# Patient Record
Sex: FEMALE | Race: WHITE | Employment: FULL TIME | URBAN - METROPOLITAN AREA
[De-identification: names, ages, dates, MRNs, and addresses within clinical notes are randomized per-mention and may not be internally consistent; named-entity substitution may affect disease eponyms.]

---

## 2022-03-19 PROBLEM — M54.32 SCIATICA OF LEFT SIDE: Status: ACTIVE | Noted: 2021-03-23

## 2022-12-20 NOTE — PROGRESS NOTES
Patient presents today for a routine gynecological examination with no complaints. Has MGM scheduled for next week. Needs PCP, will refer. OB History          2    Para   2    Term   2            AB        Living   2         SAB        IAB        Ectopic        Molar        Multiple        Live Births   2                  GYN History   Last Pap Smear: 21  Pap: negative   HPV: negative        Patient's last menstrual period was 2022 (exact date). Cycle Length 28   Lasting 5  negative dysmenorrhea;   negative postcoital bleeding    Past Medical History:  Past Medical History:   Diagnosis Date    Acute cystitis     Dysuria     Endometriosis     Gestational diabetes     with first pregnancy    Hx of seasonal allergies     Interstitial cystitis     Interstitial cystitis     Multiple thyroid nodules     Other chronic cystitis        Past Surgical History:  Past Surgical History:   Procedure Laterality Date    GYN   and 3/11    GYN  2014    laparoscopy    OTHER SURGICAL HISTORY      wisdom teeth removed    OTHER SURGICAL HISTORY      Cysto [place]-distention    UROLOGICAL SURGERY  2014    Cystoscopy and Hydroistension of bladder-Dr. Richi Pickett    WISDOM TOOTH EXTRACTION         Allergies: Allergies   Allergen Reactions    Latex Rash     Not sure if true allergy - Bandaids break her out. Sulfamethoxazole-Trimethoprim Other (See Comments)     Enlarged lymph nodes        Medication History:  Current Outpatient Medications   Medication Sig Dispense Refill    loratadine (CLARITIN) 5 MG/5ML syrup Take by mouth       No current facility-administered medications for this visit.        Social History:  Social History     Socioeconomic History    Marital status:      Spouse name: Not on file    Number of children: Not on file    Years of education: Not on file    Highest education level: Not on file   Occupational History    Not on file   Tobacco Use    Smoking status: Never Smokeless tobacco: Never   Vaping Use    Vaping Use: Never used   Substance and Sexual Activity    Alcohol use: No    Drug use: No    Sexual activity: Yes     Partners: Male     Birth control/protection: Condom   Other Topics Concern    Not on file   Social History Narrative    Not on file     Social Determinants of Health     Financial Resource Strain: Not on file   Food Insecurity: Not on file   Transportation Needs: Not on file   Physical Activity: Not on file   Stress: Not on file   Social Connections: Not on file   Intimate Partner Violence: Not on file   Housing Stability: Not on file       Family History:  Family History   Problem Relation Age of Onset    Cancer Maternal Grandmother         thyroid    Cancer Paternal Grandfather         prostate    Heart Attack Paternal Grandfather     Heart Disease Paternal Grandfather     Osteoarthritis Paternal Grandmother     Hypertension Mother     Other Mother         early hysterectomy    Asthma Father     Cancer Father         prostate removed    Diabetes Maternal Grandfather     Heart Disease Maternal Grandfather        Review of Systems - General ROS: negative except for that discussed in HPI      ROS:  Feeling well. No dyspnea or chest pain on exertion. No abdominal pain, change in bowel habits, black or bloody stools. No urinary tract symptoms. No neurological complaints. Objective:   /60   Wt 127 lb 9.6 oz (57.9 kg)   LMP 12/23/2022 (Exact Date)   BMI 22.60 kg/m²   The patient appears well, alert, oriented x 3, in no distress. ENT normal.  Neck supple. No adenopathy or thyromegaly. Lungs:  clear, good air entry, no wheezes, rhonchi or rales. Heart:  S1 and S2 normal, no murmurs, regular rate and rhythm. Abdomen:  soft without tenderness, guarding, mass or organomegaly. Extremities show no edema, normal peripheral pulses. Neurological is normal, no focal findings.     BREAST EXAM: breasts appear normal, no suspicious masses, no skin or nipple changes or axillary nodes, risk and benefit of breast self-exam was discussed    PELVIC EXAM: VULVA: normal appearing vulva with no masses, tenderness or lesions, VAGINA: normal appearing vagina with normal color and discharge, no lesions, menses noted in vault CERVIX: normal appearing cervix without discharge or lesions, UTERUS: uterus is normal size, shape, consistency and nontender, ADNEXA: normal adnexa in size, nontender and no masses    Assessment/Plan:     1. Screening mammogram for breast cancer    - NAOMY SHAILA DIGITAL SCREEN BILATERAL; Future    2. Screening for genitourinary condition    - AMB POC URINALYSIS DIP STICK AUTO W/O MICRO    3. Well woman exam       Pap due 2024  mammogram  return annually or prn    Supervising physician is Dr. Cruz Hoffman.

## 2022-12-23 ENCOUNTER — OFFICE VISIT (OUTPATIENT)
Dept: OBGYN CLINIC | Age: 42
End: 2022-12-23

## 2022-12-23 VITALS — WEIGHT: 127.6 LBS | BODY MASS INDEX: 22.6 KG/M2 | DIASTOLIC BLOOD PRESSURE: 60 MMHG | SYSTOLIC BLOOD PRESSURE: 112 MMHG

## 2022-12-23 DIAGNOSIS — Z76.89 ENCOUNTER TO ESTABLISH CARE: ICD-10-CM

## 2022-12-23 DIAGNOSIS — Z13.89 SCREENING FOR GENITOURINARY CONDITION: ICD-10-CM

## 2022-12-23 DIAGNOSIS — Z01.419 WELL WOMAN EXAM: Primary | ICD-10-CM

## 2022-12-23 DIAGNOSIS — Z12.31 SCREENING MAMMOGRAM FOR BREAST CANCER: ICD-10-CM

## 2022-12-23 LAB
BILIRUBIN, URINE, POC: NEGATIVE
BLOOD URINE, POC: NORMAL
GLUCOSE URINE, POC: NEGATIVE
KETONES, URINE, POC: NEGATIVE
LEUKOCYTE ESTERASE, URINE, POC: NEGATIVE
NITRITE, URINE, POC: NEGATIVE
PH, URINE, POC: 5.5 (ref 4.6–8)
PROTEIN,URINE, POC: NEGATIVE
SPECIFIC GRAVITY, URINE, POC: 1.02 (ref 1–1.03)
URINALYSIS CLARITY, POC: CLEAR
URINALYSIS COLOR, POC: YELLOW
UROBILINOGEN, POC: NORMAL

## 2023-02-19 PROBLEM — Z76.89 ENCOUNTER TO ESTABLISH CARE WITH NEW DOCTOR: Status: ACTIVE | Noted: 2023-02-19

## 2023-02-19 ASSESSMENT — ENCOUNTER SYMPTOMS
VOMITING: 0
ABDOMINAL PAIN: 0
SHORTNESS OF BREATH: 0
COUGH: 0
DIARRHEA: 0
NAUSEA: 0

## 2023-02-20 ENCOUNTER — OFFICE VISIT (OUTPATIENT)
Dept: PRIMARY CARE CLINIC | Facility: CLINIC | Age: 43
End: 2023-02-20
Payer: COMMERCIAL

## 2023-02-20 VITALS
HEART RATE: 71 BPM | OXYGEN SATURATION: 99 % | WEIGHT: 125.3 LBS | BODY MASS INDEX: 22.2 KG/M2 | HEIGHT: 63 IN | DIASTOLIC BLOOD PRESSURE: 64 MMHG | SYSTOLIC BLOOD PRESSURE: 116 MMHG | RESPIRATION RATE: 16 BRPM

## 2023-02-20 DIAGNOSIS — E04.1 THYROID NODULE: ICD-10-CM

## 2023-02-20 DIAGNOSIS — Z76.89 ENCOUNTER TO ESTABLISH CARE WITH NEW DOCTOR: Primary | ICD-10-CM

## 2023-02-20 DIAGNOSIS — N30.10 INTERSTITIAL CYSTITIS: ICD-10-CM

## 2023-02-20 DIAGNOSIS — K21.9 GASTROESOPHAGEAL REFLUX DISEASE, UNSPECIFIED WHETHER ESOPHAGITIS PRESENT: ICD-10-CM

## 2023-02-20 DIAGNOSIS — M54.32 SCIATICA OF LEFT SIDE: ICD-10-CM

## 2023-02-20 PROCEDURE — 99204 OFFICE O/P NEW MOD 45 MIN: CPT | Performed by: FAMILY MEDICINE

## 2023-02-20 RX ORDER — FAMOTIDINE 20 MG/1
20 TABLET, FILM COATED ORAL 2 TIMES DAILY
Qty: 60 TABLET | Refills: 3 | Status: SHIPPED | OUTPATIENT
Start: 2023-02-20

## 2023-02-20 SDOH — ECONOMIC STABILITY: INCOME INSECURITY: HOW HARD IS IT FOR YOU TO PAY FOR THE VERY BASICS LIKE FOOD, HOUSING, MEDICAL CARE, AND HEATING?: NOT HARD AT ALL

## 2023-02-20 SDOH — ECONOMIC STABILITY: FOOD INSECURITY: WITHIN THE PAST 12 MONTHS, THE FOOD YOU BOUGHT JUST DIDN'T LAST AND YOU DIDN'T HAVE MONEY TO GET MORE.: NEVER TRUE

## 2023-02-20 SDOH — ECONOMIC STABILITY: HOUSING INSECURITY
IN THE LAST 12 MONTHS, WAS THERE A TIME WHEN YOU DID NOT HAVE A STEADY PLACE TO SLEEP OR SLEPT IN A SHELTER (INCLUDING NOW)?: NO

## 2023-02-20 SDOH — ECONOMIC STABILITY: FOOD INSECURITY: WITHIN THE PAST 12 MONTHS, YOU WORRIED THAT YOUR FOOD WOULD RUN OUT BEFORE YOU GOT MONEY TO BUY MORE.: NEVER TRUE

## 2023-02-20 ASSESSMENT — PATIENT HEALTH QUESTIONNAIRE - PHQ9
SUM OF ALL RESPONSES TO PHQ9 QUESTIONS 1 & 2: 0
SUM OF ALL RESPONSES TO PHQ QUESTIONS 1-9: 0
1. LITTLE INTEREST OR PLEASURE IN DOING THINGS: 0
SUM OF ALL RESPONSES TO PHQ QUESTIONS 1-9: 0
2. FEELING DOWN, DEPRESSED OR HOPELESS: 0
SUM OF ALL RESPONSES TO PHQ QUESTIONS 1-9: 0
SUM OF ALL RESPONSES TO PHQ QUESTIONS 1-9: 0

## 2023-02-20 ASSESSMENT — ANXIETY QUESTIONNAIRES
GAD7 TOTAL SCORE: 0
5. BEING SO RESTLESS THAT IT IS HARD TO SIT STILL: 0
1. FEELING NERVOUS, ANXIOUS, OR ON EDGE: 0
4. TROUBLE RELAXING: 0
7. FEELING AFRAID AS IF SOMETHING AWFUL MIGHT HAPPEN: 0
2. NOT BEING ABLE TO STOP OR CONTROL WORRYING: 0
IF YOU CHECKED OFF ANY PROBLEMS ON THIS QUESTIONNAIRE, HOW DIFFICULT HAVE THESE PROBLEMS MADE IT FOR YOU TO DO YOUR WORK, TAKE CARE OF THINGS AT HOME, OR GET ALONG WITH OTHER PEOPLE: NOT DIFFICULT AT ALL
3. WORRYING TOO MUCH ABOUT DIFFERENT THINGS: 0
6. BECOMING EASILY ANNOYED OR IRRITABLE: 0

## 2023-02-20 NOTE — ASSESSMENT & PLAN NOTE
More irritation lately, denies nausea or vomiting, denies blood in her stool or early satiety. Will try on Pepcid twice a day. Discussed monitoring her diet, not eating too late and propping herself up at night if symptoms worsen. Will recheck in 6 months.

## 2023-02-20 NOTE — ASSESSMENT & PLAN NOTE
No change after extensive monitoring in the past, so has yearly labs checked at work, due to have these done soon. Will have her send me those once she has them done.

## 2023-02-20 NOTE — ASSESSMENT & PLAN NOTE
Chronic issues, managed with intermittent exercise. Is not affecting her daily life. Will continue to monitor.

## 2023-02-20 NOTE — PROGRESS NOTES
[address], DO  [address] [facility], [place] [address], [address]  [phone]        ASSESSMENT AND PLAN    Problem List Items Addressed This Visit          Digestive    Gastroesophageal reflux disease     More irritation lately, denies nausea or vomiting, denies blood in her stool or early satiety. Will try on Pepcid twice a day. Discussed monitoring her diet, not eating too late and propping herself up at night if symptoms worsen. Will recheck in 6 months. Relevant Medications    famotidine (PEPCID) 20 MG tablet       Endocrine    Thyroid nodule     No change after extensive monitoring in the past, so has yearly labs checked at work, due to have these done soon. Will have her send me those once she has them done. Nervous and Auditory    Sciatica of left side      Chronic issues, managed with intermittent exercise. Is not affecting her daily life. Will continue to monitor. Genitourinary    Interstitial cystitis     Managed with diet, not affecting her every day, will continue to monitor. Other    Encounter to establish care with new doctor - Primary        The diagnoses and plan were discussed with the patient, who verbalizes understanding and agrees with plan. All questions answered. Chief Complaint    Chief Complaint   Patient presents with    Establish Care       HISTORY OF PRESENT ILLNESS    38 y.o. female presents today to establish care with a new primary care provider. Sees Dr. Mk Hollins NP for endometriosis. Denies any issues with blood sugar since her first pregnancy, denies any diabetes in the second pregnancy. Notes that she has a diagnosis of interstitial cystitis, had a bladder distension about 7 or 8 years ago, which helped for a few years. States that she has some flank pain with her interstitial cystitis but manages it by cutting back on acidic food and drinks.   Has issues with heartburn, states that it has been worse the past few weeks. Notes that she will wake up with a burning sensation. Sometimes takes Tums. Denies blood in her stool, nausea or vomiting. Denies issues with eating, usually in the morning. History of thyroid nodule, had routine ultrasounds that were not changing. Usually gets her blood work checked at work once a year, adds on her thyroid test.  Reports that everything is fine. Had a normal mammogram in the end of December.       PAST MEDICAL HISTORY    Past Medical History:   Diagnosis Date    Chronic back pain 2014    Possibly related to interstitial cystitis    Endometriosis     GERD (gastroesophageal reflux disease) 2016    Gestational diabetes     with first pregnancy    Hx of seasonal allergies     Interstitial cystitis     Irritable bowel syndrome 2016    Multiple thyroid nodules     Thyroid nodule 7/20/2016       PAST SURGICAL HISTORY    Past Surgical History:   Procedure Laterality Date    GYN  6/08 and 3/11    GYN  Feb 2014    laparoscopy    OTHER SURGICAL HISTORY      wisdom teeth removed    OTHER SURGICAL HISTORY      Cysto [place]-distention    UPPER GASTROINTESTINAL ENDOSCOPY  March 2016    UROLOGICAL SURGERY  Feb 2014    Cystoscopy and Hydroistension of bladder-Dr. Shira Moyer    WISDOM TOOTH EXTRACTION         FAMILY HISTORY    Family History   Problem Relation Age of Onset    Cancer Maternal Grandmother         Thyroid cancer    Cancer Paternal Grandfather         prostate    Heart Attack Paternal Grandfather     Heart Disease Paternal Grandfather     Osteoarthritis Paternal Grandmother     Hypertension Mother     Other Mother         early hysterectomy    High Blood Pressure Mother     High Cholesterol Mother     Asthma Father     Cancer Father         prostate removed    Prostate Cancer Father     Diabetes Maternal Grandfather     Heart Disease Maternal Grandfather        SOCIAL HISTORY    Social History     Socioeconomic History    Marital status:  Spouse name: None    Number of children: None    Years of education: None    Highest education level: None   Tobacco Use    Smoking status: Never    Smokeless tobacco: Never   Vaping Use    Vaping Use: Never used   Substance and Sexual Activity    Alcohol use: Never    Drug use: Never    Sexual activity: Yes     Partners: Male     Birth control/protection: Condom     Social Determinants of Health     Financial Resource Strain: Low Risk     Difficulty of Paying Living Expenses: Not hard at all   Food Insecurity: No Food Insecurity    Worried About [address] in the Last Year: Never true    [address] [facility] [employer] in the Last Year: Never true   Transportation Needs: Unknown    Lack of Transportation (Non-Medical): No   Housing Stability: Unknown    Unstable Housing in the Last Year: No       MEDICATIONS    Current Outpatient Medications:     famotidine (PEPCID) 20 MG tablet, Take 1 tablet by mouth 2 times daily, Disp: 60 tablet, Rfl: 3    loratadine (CLARITIN) 5 MG/5ML syrup, Take by mouth (Patient not taking: Reported on 2/20/2023), Disp: , Rfl:     ALLERGIES / INTOLERANCES    Allergies   Allergen Reactions    Latex Rash     Not sure if true allergy - Bandaids break her out. Sulfamethoxazole-Trimethoprim Other (See Comments)     Enlarged lymph nodes        REVIEW OF SYSTEMS    Review of Systems   Constitutional:  Negative for fever. HENT:  Negative for congestion. Respiratory:  Negative for cough and shortness of breath. Cardiovascular:  Negative for chest pain. Gastrointestinal:  Negative for abdominal pain, diarrhea, nausea and vomiting. Psychiatric/Behavioral:  Negative for dysphoric mood. PHYSICAL EXAMINATION    Vitals:    02/20/23 0923   BP: 116/64   Pulse: 71   Resp: 16   SpO2: 99%       Physical Exam  Vitals and nursing note reviewed. Constitutional:       General: She is not in acute distress. Appearance: Normal appearance. HENT:      Head: Normocephalic and atraumatic. Right Ear: External ear normal.      Left Ear: External ear normal.      Nose: Nose normal.   Eyes:      Extraocular Movements: Extraocular movements intact. Pupils: Pupils are equal, round, and reactive to light. Cardiovascular:      Rate and Rhythm: Normal rate and regular rhythm. Heart sounds: Normal heart sounds. No murmur heard. Pulmonary:      Effort: Pulmonary effort is normal. No respiratory distress. Breath sounds: Normal breath sounds. Abdominal:      General: Bowel sounds are normal.      Palpations: Abdomen is soft. Tenderness: There is no abdominal tenderness. There is no right CVA tenderness or left CVA tenderness. Musculoskeletal:         General: Normal range of motion. Cervical back: Normal range of motion. Skin:     General: Skin is warm. Findings: No rash. Neurological:      General: No focal deficit present. Mental Status: She is alert.    Psychiatric:         Mood and Affect: Mood normal.         Behavior: Behavior normal.         Keara Severino DO  9:46 AM  02/20/23

## 2023-02-20 NOTE — PATIENT INSTRUCTIONS
IT WAS GREAT TO SEE YOU TODAY! PLEASE SEND ME THE RESULTS OF YOUR LABS WHEN YOU HAVE THEM DONE AT WORK. PLEASE TAKE PEPCID IN THE MORNING AND AT BEDTIME TO HELP WITH HEARTBURN.  TRY NOT TO EAT TOO LATE AND TRY TO AVOID SPICY FOODS, ALCOHOL, SODA, CHOCOLATE, FRIED FOOD AND OTHER RICH FOODS. SOME PEOPLE SLEEP ON MULTIPLE PILLOWS OR A WEDGE PILLOW TO HELP WITH REFLUX.     I WILL SEE YOU AGAIN IN 6 MONTHS BUT PLEASE CALL WITH CONCERNS [phone]

## 2023-09-07 ENCOUNTER — OFFICE VISIT (OUTPATIENT)
Dept: PRIMARY CARE CLINIC | Facility: CLINIC | Age: 43
End: 2023-09-07
Payer: COMMERCIAL

## 2023-09-07 VITALS
RESPIRATION RATE: 17 BRPM | WEIGHT: 125.8 LBS | SYSTOLIC BLOOD PRESSURE: 122 MMHG | HEIGHT: 63 IN | BODY MASS INDEX: 22.29 KG/M2 | OXYGEN SATURATION: 99 % | HEART RATE: 89 BPM | DIASTOLIC BLOOD PRESSURE: 65 MMHG

## 2023-09-07 DIAGNOSIS — K21.9 GASTROESOPHAGEAL REFLUX DISEASE, UNSPECIFIED WHETHER ESOPHAGITIS PRESENT: Primary | ICD-10-CM

## 2023-09-07 DIAGNOSIS — S52.125D CLOSED NONDISPLACED FRACTURE OF HEAD OF LEFT RADIUS WITH ROUTINE HEALING, SUBSEQUENT ENCOUNTER: ICD-10-CM

## 2023-09-07 PROBLEM — S52.126D CLOSED NONDISPLACED FRACTURE OF HEAD OF RADIUS WITH ROUTINE HEALING: Status: ACTIVE | Noted: 2023-09-07

## 2023-09-07 PROCEDURE — 99214 OFFICE O/P EST MOD 30 MIN: CPT | Performed by: FAMILY MEDICINE

## 2023-09-07 RX ORDER — NAPROXEN 500 MG/1
TABLET ORAL
COMMUNITY
End: 2023-09-07 | Stop reason: ALTCHOICE

## 2023-09-07 ASSESSMENT — PATIENT HEALTH QUESTIONNAIRE - PHQ9
SUM OF ALL RESPONSES TO PHQ QUESTIONS 1-9: 0
SUM OF ALL RESPONSES TO PHQ9 QUESTIONS 1 & 2: 0
1. LITTLE INTEREST OR PLEASURE IN DOING THINGS: 0
SUM OF ALL RESPONSES TO PHQ QUESTIONS 1-9: 0
SUM OF ALL RESPONSES TO PHQ QUESTIONS 1-9: 0
2. FEELING DOWN, DEPRESSED OR HOPELESS: 0
SUM OF ALL RESPONSES TO PHQ QUESTIONS 1-9: 0

## 2023-09-07 ASSESSMENT — ENCOUNTER SYMPTOMS
VOMITING: 0
SHORTNESS OF BREATH: 0
NAUSEA: 0
COUGH: 0
DIARRHEA: 0
ABDOMINAL PAIN: 0

## 2023-09-07 NOTE — PROGRESS NOTES
Travon Jarrett DO  [place], [address], [address] [place] [address]  [phone]         ASSESSMENT AND PLAN    Problem List Items Addressed This Visit          Digestive    Gastroesophageal reflux disease - Primary     Improved, not taking Pepcid daily, discussed use as needed, will continue to monitor. Musculoskeletal and Integument    Closed nondisplaced fracture of head of radius with routine healing     Healing well, completing PT, mostly normal ROM today. Will continue to monitor. The diagnoses and plan were discussed with the patient, who verbalizes understanding and agrees with plan. All questions answered. Chief Complaint    Chief Complaint   Patient presents with    Follow-up       HISTORY OF PRESENT ILLNESS    41 y.o. female with GERD presents today for follow up. Last seen by me 6 months ago to establish care, was having more reflux symptoms so was started on Pepcid twice a day. States that she fractured her left elbow on July 12 when she fell onto her elbow while putting something in a cabinet. Is almost back to normal, states that she has been doing PT. Denies any more pain unless she exerts it at PT. States that her reflux has been doing fine, states that she stopped her Pepcid a couple of months ago. Had labs done at work that were normal.  No other concerns today.     PAST MEDICAL HISTORY    Past Medical History:   Diagnosis Date    Chronic back pain 2014    Possibly related to interstitial cystitis    Endometriosis     GERD (gastroesophageal reflux disease) 2016    Gestational diabetes     with first pregnancy    Hx of seasonal allergies     Interstitial cystitis     Irritable bowel syndrome 2016    Multiple thyroid nodules     Thyroid nodule 7/20/2016       PAST SURGICAL HISTORY    Past Surgical History:   Procedure Laterality Date    GYN  6/08 and 3/11    GYN  Feb 2014    laparoscopy    OTHER SURGICAL

## 2023-09-08 NOTE — PATIENT INSTRUCTIONS
IT WAS GREAT TO SEE YOU TODAY! KEEP UP THE GOOD WORK WITH PHYSICAL THERAPY FOR YOUR ELBOW    PLEASE TAKE ALL MEDICATION AS DISCUSSED.    ~USE THE PEPCID AS NEEDED FOR HEARTBURN.     I WILL SEE YOU AGAIN IN 1 YEAR BUT PLEASE CALL WITH CONCERNS [phone]

## 2024-01-17 RX ORDER — FAMOTIDINE 20 MG/1
20 TABLET, FILM COATED ORAL 2 TIMES DAILY
Qty: 180 TABLET | Refills: 1 | Status: SHIPPED | OUTPATIENT
Start: 2024-01-17

## 2024-02-19 ENCOUNTER — OFFICE VISIT (OUTPATIENT)
Dept: OBGYN CLINIC | Age: 44
End: 2024-02-19
Payer: COMMERCIAL

## 2024-02-19 VITALS
HEIGHT: 63 IN | WEIGHT: 127.2 LBS | BODY MASS INDEX: 22.54 KG/M2 | SYSTOLIC BLOOD PRESSURE: 120 MMHG | DIASTOLIC BLOOD PRESSURE: 76 MMHG

## 2024-02-19 DIAGNOSIS — Z12.31 SCREENING MAMMOGRAM FOR BREAST CANCER: ICD-10-CM

## 2024-02-19 DIAGNOSIS — Z11.51 SCREENING FOR HPV (HUMAN PAPILLOMAVIRUS): ICD-10-CM

## 2024-02-19 DIAGNOSIS — Z13.89 SCREENING FOR GENITOURINARY CONDITION: ICD-10-CM

## 2024-02-19 DIAGNOSIS — Z12.4 SCREENING FOR CERVICAL CANCER: ICD-10-CM

## 2024-02-19 DIAGNOSIS — Z01.419 WELL WOMAN EXAM: Primary | ICD-10-CM

## 2024-02-19 LAB
BILIRUBIN, URINE, POC: NEGATIVE
BLOOD URINE, POC: NORMAL
GLUCOSE URINE, POC: NEGATIVE
KETONES, URINE, POC: NEGATIVE
LEUKOCYTE ESTERASE, URINE, POC: NEGATIVE
NITRITE, URINE, POC: NEGATIVE
PH, URINE, POC: 5.5 (ref 4.6–8)
PROTEIN,URINE, POC: NEGATIVE
SPECIFIC GRAVITY, URINE, POC: 1.03 (ref 1–1.03)
URINALYSIS CLARITY, POC: CLEAR
URINALYSIS COLOR, POC: YELLOW
UROBILINOGEN, POC: NORMAL

## 2024-02-19 PROCEDURE — 99459 PELVIC EXAMINATION: CPT | Performed by: NURSE PRACTITIONER

## 2024-02-19 PROCEDURE — 99396 PREV VISIT EST AGE 40-64: CPT | Performed by: NURSE PRACTITIONER

## 2024-02-19 PROCEDURE — 81002 URINALYSIS NONAUTO W/O SCOPE: CPT | Performed by: NURSE PRACTITIONER

## 2024-02-19 NOTE — PROGRESS NOTES
Patient presents today for a routine gynecological examination. Patient c/o abnormal spotting between her last period that lasted for about a week. No other concerns.    At first spotting was only when wiping then increased slightly to where she needed a liner. It stopped quickly and she had no further spotting.   Advised her to monitor moving forward and call if happens again in the future and will plan for US.       Pap Smear:21-Negative/HPV Negative  Mammogram:02/10/2024-Benign  Colonoscopy: NA  DEXA: NA    OB History          2    Para   2    Term   2            AB        Living   2         SAB        IAB        Ectopic        Molar        Multiple        Live Births   2                  GYN History           Patient's last menstrual period was 2024 (exact date). Cycle Length 28 Lasting 7  positive dysmenorrhea; negative postcoital bleeding    Past Medical History:  Past Medical History:   Diagnosis Date    Chronic back pain     Possibly related to interstitial cystitis    Drug effect 2007    Latex sensitivity    Endometriosis     GERD (gastroesophageal reflux disease)     Gestational diabetes     with first pregnancy    Gestational diabetes mellitus 2008    Hx of seasonal allergies     Interstitial cystitis     Irritable bowel syndrome     Multiple thyroid nodules     Thyroid nodule 2016       Past Surgical History:  Past Surgical History:   Procedure Laterality Date    GYN   and 3/11    GYN  2014    laparoscopy    LAPAROSCOPY      OTHER SURGICAL HISTORY      wisdom teeth removed    OTHER SURGICAL HISTORY      Cysto [place]-distention    UPPER GASTROINTESTINAL ENDOSCOPY  2016    UROLOGICAL SURGERY  2014    Cystoscopy and Hydroistension of bladder-Dr. Abhijeet TORRES TOOTH EXTRACTION         Allergies:   Allergies   Allergen Reactions    Latex Rash     Not sure if true allergy - Bandaids break her out.    Sulfamethoxazole-Trimethoprim Other

## 2024-02-29 LAB
COLLECTION METHOD: ABNORMAL
CYTOLOGIST CVX/VAG CYTO: ABNORMAL
CYTOLOGY CVX/VAG DOC THIN PREP: ABNORMAL
HPV APTIMA: NEGATIVE
Lab: ABNORMAL
Lab: ABNORMAL
PAP SOURCE: ABNORMAL
PATH REPORT.FINAL DX SPEC: ABNORMAL
PATHOLOGIST CVX/VAG CYTO: ABNORMAL
PATHOLOGIST PROVIDED ICD: ABNORMAL
STAT OF ADQ CVX/VAG CYTO-IMP: ABNORMAL

## 2024-03-01 ENCOUNTER — TELEPHONE (OUTPATIENT)
Dept: OBGYN CLINIC | Age: 44
End: 2024-03-01

## 2024-03-01 NOTE — TELEPHONE ENCOUNTER
Phoned patient to review pap smear results and to schedule talk with Keily to review these results as recommended by NP. No answer, LVM informing patient mychart message was sent. No medical information left on voicemail.

## 2024-03-01 NOTE — TELEPHONE ENCOUNTER
----- Message from Keily Calle, IMELDA - CNP sent at 3/1/2024  8:06 AM EST -----  Pls notify pt of AGC pap  She will need colpo and EMBx to assess further, pls describe procedures to pt and prep with cytotec. Pls schedule.

## 2024-03-04 ENCOUNTER — OFFICE VISIT (OUTPATIENT)
Dept: OBGYN CLINIC | Age: 44
End: 2024-03-04
Payer: COMMERCIAL

## 2024-03-04 VITALS
SYSTOLIC BLOOD PRESSURE: 118 MMHG | WEIGHT: 126.1 LBS | HEIGHT: 63 IN | DIASTOLIC BLOOD PRESSURE: 74 MMHG | BODY MASS INDEX: 22.34 KG/M2

## 2024-03-04 DIAGNOSIS — R87.619 ATYPICAL GLANDULAR CELLS OF UNDETERMINED SIGNIFICANCE (AGUS) ON CERVICAL PAP SMEAR: Primary | ICD-10-CM

## 2024-03-04 PROCEDURE — 99213 OFFICE O/P EST LOW 20 MIN: CPT | Performed by: NURSE PRACTITIONER

## 2024-03-04 NOTE — PROGRESS NOTES
The patient is a 43 y.o.  who is seen to review results on pap smear done on 2024.     She has no prior hx abnl pap  Overall monthly and regular periods aside from 1 recent episode of midcycle spotting that lasted about a week. She has had no further abnl spotting since then.     Pap smear results 24-EPITHELIAL CELL ABNORMALITY.   ATYPICAL GLANDULAR CELLS (AGC). /HPV negative    HISTORY:      Patient's last menstrual period was 2024 (exact date).  Sexual History:  has sex with males  Contraception:  condoms  Current Outpatient Medications on File Prior to Visit   Medication Sig Dispense Refill    famotidine (PEPCID) 20 MG tablet TAKE 1 TABLET BY MOUTH TWICE A  tablet 1     No current facility-administered medications on file prior to visit.       ROS:  Feeling well. No dyspnea or chest pain on exertion.  No abdominal pain, change in bowel habits, black or bloody stools.  No urinary tract symptoms. GYN ROS: she complains of recent abnl pap.    PHYSICAL EXAM:  Blood pressure 118/74, height 1.6 m (5' 3\"), weight 57.2 kg (126 lb 1.6 oz), last menstrual period 2024.    The patient appears well, alert, oriented x 3, in no distress.  Exam deferred, talk only    ASSESSMENT:  Encounter Diagnosis   Name Primary?    Atypical glandular cells of undetermined significance (DILIP) on cervical Pap smear Yes       PLAN:  All questions answered  Diagnosis explained in detail, including differential  Lengthy disc with pt on recent AGC pap.   Disc pap screening and HPV at length.   Disc etiology of glandular cells in pap smear and need to differentiate between cervical origin vs endometrial origin.   Reviewed asccp guidelines and rec for colpo and EMBx to evaluate further.   Procedure reviewed at length.   All ?s answered  Will schedule colpo with EMBx.    No orders of the defined types were placed in this encounter.    Supervising physician is Dr. Montero.  20 min chart review, counseling and

## 2024-03-08 ENCOUNTER — TELEPHONE (OUTPATIENT)
Dept: OBGYN CLINIC | Age: 44
End: 2024-03-08

## 2024-03-25 NOTE — PROGRESS NOTES
Colposcopy Exam    3/26/24    Name:  Chyna Harris    :  1980 Age:  43 y.o.    Indication for Colposcopy:  AGC  HPV: Negative.    Contraceptive method:  condoms    LMP:    Tobacco Use:    Social History     Tobacco Use   Smoking Status Never   Smokeless Tobacco Never     Medications:    Current Outpatient Medications on File Prior to Visit   Medication Sig Dispense Refill    famotidine (PEPCID) 20 MG tablet TAKE 1 TABLET BY MOUTH TWICE A  tablet 1     No current facility-administered medications on file prior to visit.     Allergies:    Allergies as of 2024 - Fully Reviewed 2024   Allergen Reaction Noted    Latex Rash 2008    Sulfamethoxazole-trimethoprim Other (See Comments) 2021       uHCG:  negative    Abnormal Pap and Colposcopy History:  wnl prior to last pap.  HPV testing:  neg.    Procedure:  The patient is counseled regarding the risks of coloposcopy, including bleeding, infection at the biopsy site or endometrium, and failure to identify the lesion.  Patient was placed in the lithotomy position.  The vulva was examined for suspicious lesions.  Subsequently a speculum was inserted and the cervix and vaginal wall were visualized.  3% Acetic acid was applied to the cervix using cotton swabs and the area was further visualized.  Abnormal areas were not identified in the portio of the cervix and ECC biopsies were obtained.         Adequate Procedure: yes  Pap Performed:  no  ECC Performed yes  Additional lesion identified: none  Summary:  normal exam without visible pathology     ENDOMETRIAL BIOPSY    Date:  1980    Name:  Chyna Harris     :  1980    Age:  43 y.o.    Reason for procedure:  DILIP        Speculum was inserted.  Cervix visualized and cleansed with betadine.  Single-tooth tenaculum was applied to anterior lip.  Curette was inserted through OS.  Uterus sounded to 9 cm.  Tissue obtained was adequate in amount.  Patient

## 2024-03-26 ENCOUNTER — PROCEDURE VISIT (OUTPATIENT)
Dept: OBGYN CLINIC | Age: 44
End: 2024-03-26
Payer: COMMERCIAL

## 2024-03-26 VITALS — WEIGHT: 127.6 LBS | BODY MASS INDEX: 22.61 KG/M2 | HEIGHT: 63 IN

## 2024-03-26 DIAGNOSIS — Z01.812 PRE-PROCEDURAL LABORATORY EXAMINATION: ICD-10-CM

## 2024-03-26 DIAGNOSIS — R87.619 ATYPICAL GLANDULAR CELLS OF UNDETERMINED SIGNIFICANCE (AGUS) ON CERVICAL PAP SMEAR: Primary | ICD-10-CM

## 2024-03-26 LAB
HCG, PREGNANCY, URINE, POC: NEGATIVE
VALID INTERNAL CONTROL, POC: YES

## 2024-03-26 PROCEDURE — 57455 BIOPSY OF CERVIX W/SCOPE: CPT | Performed by: OBSTETRICS & GYNECOLOGY

## 2024-03-26 PROCEDURE — 81025 URINE PREGNANCY TEST: CPT | Performed by: OBSTETRICS & GYNECOLOGY

## 2024-03-26 PROCEDURE — 58110 BX DONE W/COLPOSCOPY ADD-ON: CPT | Performed by: OBSTETRICS & GYNECOLOGY

## 2024-03-29 ENCOUNTER — TELEPHONE (OUTPATIENT)
Dept: OBGYN CLINIC | Age: 44
End: 2024-03-29

## 2024-03-29 NOTE — TELEPHONE ENCOUNTER
----- Message from Fabio Boothe MD sent at 3/28/2024  4:53 PM EDT -----  Regarding: colpo with endo Bx  Both tests cervix and endometrium, were wnl, so next pap 4-6 months  ----- Message -----  From: [facility] Incoming [place] [facility]  Sent: 3/28/2024   2:41 PM EDT  To: Fabio Boothe MD

## 2024-03-29 NOTE — TELEPHONE ENCOUNTER
LMOM notifying patient of benign ECC and EMB and need for repeat pap smear in 4-6 months.  Pt to call back to schedule repap or with any questions/concerns.

## 2024-04-30 ENCOUNTER — OFFICE VISIT (OUTPATIENT)
Dept: PRIMARY CARE CLINIC | Facility: CLINIC | Age: 44
End: 2024-04-30
Payer: COMMERCIAL

## 2024-04-30 VITALS
OXYGEN SATURATION: 99 % | DIASTOLIC BLOOD PRESSURE: 80 MMHG | HEIGHT: 63 IN | WEIGHT: 126 LBS | SYSTOLIC BLOOD PRESSURE: 118 MMHG | BODY MASS INDEX: 22.32 KG/M2 | HEART RATE: 94 BPM

## 2024-04-30 DIAGNOSIS — R10.9 RIGHT SIDED ABDOMINAL PAIN: Primary | ICD-10-CM

## 2024-04-30 DIAGNOSIS — S39.011A STRAIN OF ABDOMINAL MUSCLE, INITIAL ENCOUNTER: ICD-10-CM

## 2024-04-30 PROBLEM — U07.1 COVID-19: Status: ACTIVE | Noted: 2021-08-25

## 2024-04-30 PROBLEM — M62.81 MUSCLE WEAKNESS OF EXTREMITY: Status: ACTIVE | Noted: 2023-08-17

## 2024-04-30 PROBLEM — M25.522 ARTHRALGIA OF LEFT ELBOW: Status: ACTIVE | Noted: 2023-08-17

## 2024-04-30 PROBLEM — S52.122A CLOSED FRACTURE OF HEAD OF LEFT RADIUS: Status: ACTIVE | Noted: 2023-08-02

## 2024-04-30 PROBLEM — M25.622 STIFFNESS OF LEFT ELBOW JOINT: Status: ACTIVE | Noted: 2023-08-17

## 2024-04-30 PROBLEM — S50.02XA CONTUSION OF LEFT ELBOW: Status: ACTIVE | Noted: 2023-08-03

## 2024-04-30 PROCEDURE — 99213 OFFICE O/P EST LOW 20 MIN: CPT | Performed by: FAMILY MEDICINE

## 2024-04-30 RX ORDER — METHOCARBAMOL 500 MG/1
500 TABLET, FILM COATED ORAL 3 TIMES DAILY PRN
Qty: 30 TABLET | Refills: 0 | Status: SHIPPED | OUTPATIENT
Start: 2024-04-30 | End: 2024-05-10

## 2024-04-30 SDOH — ECONOMIC STABILITY: FOOD INSECURITY: WITHIN THE PAST 12 MONTHS, THE FOOD YOU BOUGHT JUST DIDN'T LAST AND YOU DIDN'T HAVE MONEY TO GET MORE.: NEVER TRUE

## 2024-04-30 SDOH — ECONOMIC STABILITY: INCOME INSECURITY: HOW HARD IS IT FOR YOU TO PAY FOR THE VERY BASICS LIKE FOOD, HOUSING, MEDICAL CARE, AND HEATING?: NOT HARD AT ALL

## 2024-04-30 SDOH — ECONOMIC STABILITY: FOOD INSECURITY: WITHIN THE PAST 12 MONTHS, YOU WORRIED THAT YOUR FOOD WOULD RUN OUT BEFORE YOU GOT MONEY TO BUY MORE.: NEVER TRUE

## 2024-04-30 ASSESSMENT — ENCOUNTER SYMPTOMS
COUGH: 0
VOMITING: 0
ABDOMINAL PAIN: 1
SHORTNESS OF BREATH: 0
NAUSEA: 0
DIARRHEA: 0

## 2024-04-30 ASSESSMENT — PATIENT HEALTH QUESTIONNAIRE - PHQ9
SUM OF ALL RESPONSES TO PHQ QUESTIONS 1-9: 0
2. FEELING DOWN, DEPRESSED OR HOPELESS: NOT AT ALL
SUM OF ALL RESPONSES TO PHQ9 QUESTIONS 1 & 2: 0
SUM OF ALL RESPONSES TO PHQ QUESTIONS 1-9: 0
1. LITTLE INTEREST OR PLEASURE IN DOING THINGS: NOT AT ALL
SUM OF ALL RESPONSES TO PHQ QUESTIONS 1-9: 0
SUM OF ALL RESPONSES TO PHQ QUESTIONS 1-9: 0

## 2024-04-30 NOTE — ASSESSMENT & PLAN NOTE
Patient with right-sided abdominal pain for the last week.  States that it started the day after going to the gym.  Denies nausea, vomiting or problems using the restroom.  No tenderness when laying down but palpable muscle spasm when sitting up and palpated just right of the umbilicus.  Suspect abdominal wall muscle strain.  Discussed caution with core exercises and discussed use of methocarbamol with Advil or Tylenol and heating pad.  Discussed drinking plenty of water and let me know if it does not improve or worsens.

## 2024-04-30 NOTE — PROGRESS NOTES
Pupils are equal, round, and reactive to light.   Cardiovascular:      Rate and Rhythm: Normal rate and regular rhythm.      Heart sounds: Normal heart sounds. No murmur heard.  Pulmonary:      Effort: Pulmonary effort is normal. No respiratory distress.      Breath sounds: Normal breath sounds.   Abdominal:      General: Bowel sounds are normal.      Palpations: Abdomen is soft.      Tenderness: There is no abdominal tenderness. There is no right CVA tenderness, left CVA tenderness, guarding or rebound. Negative signs include McBurney's sign and psoas sign.      Comments: No tenderness with patient laying down and palpating her abdomen, has palpable tenderness just right of umbilicus with palpable muscle spasm   Musculoskeletal:         General: Normal range of motion.      Cervical back: Normal range of motion.   Skin:     General: Skin is warm.      Findings: No rash.   Neurological:      General: No focal deficit present.      Mental Status: She is alert.   Psychiatric:         Mood and Affect: Mood normal.         Behavior: Behavior normal.             Sully Hollins DO  5:31 PM  04/30/24

## 2024-04-30 NOTE — PATIENT INSTRUCTIONS
IT WAS GREAT TO SEE YOU TODAY!    USE A HEATING PAD TO HELP WITH YOUR ABDOMINAL PAIN.  DRINK LOTS OF WATER.  AVOID CORE EXERCISES FOR A WEEK OR TWO.    PLEASE TAKE ALL MEDICATION AS DISCUSSED.    ~TAKE THE METHOCARBAMOL AS NEEDED FOR MUSCLE SPASMS.    ~YOU CAN TAKE ADVIL OR TYLENOL IF NEEDED WITH THE MUSCLE RELAXANT.    I WILL SEE YOU AGAIN IN 5 MONTHS BUT PLEASE CALL WITH CONCERNS [phone]

## 2024-05-23 ENCOUNTER — TELEMEDICINE (OUTPATIENT)
Dept: PRIMARY CARE CLINIC | Facility: CLINIC | Age: 44
End: 2024-05-23
Payer: COMMERCIAL

## 2024-05-23 DIAGNOSIS — J01.91 ACUTE RECURRENT SINUSITIS, UNSPECIFIED LOCATION: Primary | ICD-10-CM

## 2024-05-23 PROCEDURE — 99213 OFFICE O/P EST LOW 20 MIN: CPT | Performed by: FAMILY MEDICINE

## 2024-05-23 RX ORDER — CEFDINIR 300 MG/1
300 CAPSULE ORAL 2 TIMES DAILY
Qty: 20 CAPSULE | Refills: 0 | Status: SHIPPED | OUTPATIENT
Start: 2024-05-23 | End: 2024-06-02

## 2024-05-23 SDOH — ECONOMIC STABILITY: FOOD INSECURITY: WITHIN THE PAST 12 MONTHS, YOU WORRIED THAT YOUR FOOD WOULD RUN OUT BEFORE YOU GOT MONEY TO BUY MORE.: NEVER TRUE

## 2024-05-23 SDOH — ECONOMIC STABILITY: INCOME INSECURITY: HOW HARD IS IT FOR YOU TO PAY FOR THE VERY BASICS LIKE FOOD, HOUSING, MEDICAL CARE, AND HEATING?: NOT HARD AT ALL

## 2024-05-23 SDOH — ECONOMIC STABILITY: FOOD INSECURITY: WITHIN THE PAST 12 MONTHS, THE FOOD YOU BOUGHT JUST DIDN'T LAST AND YOU DIDN'T HAVE MONEY TO GET MORE.: NEVER TRUE

## 2024-05-23 ASSESSMENT — ENCOUNTER SYMPTOMS
ABDOMINAL PAIN: 0
NAUSEA: 0
DIARRHEA: 0
SINUS PRESSURE: 1
RHINORRHEA: 1
COUGH: 0
SHORTNESS OF BREATH: 0
VOMITING: 0

## 2024-05-23 ASSESSMENT — PATIENT HEALTH QUESTIONNAIRE - PHQ9
1. LITTLE INTEREST OR PLEASURE IN DOING THINGS: NOT AT ALL
SUM OF ALL RESPONSES TO PHQ QUESTIONS 1-9: 0
SUM OF ALL RESPONSES TO PHQ9 QUESTIONS 1 & 2: 0
SUM OF ALL RESPONSES TO PHQ QUESTIONS 1-9: 0
2. FEELING DOWN, DEPRESSED OR HOPELESS: NOT AT ALL
SUM OF ALL RESPONSES TO PHQ QUESTIONS 1-9: 0
SUM OF ALL RESPONSES TO PHQ QUESTIONS 1-9: 0

## 2024-05-23 NOTE — ASSESSMENT & PLAN NOTE
Just treated three months ago with Augmentin.  Has had over a week of congestion with worsening sinus pressure.  Will treat with Omnicef.  Discussed continued use of Claritin-D and discussed using her nose spray as prescribed.

## 2024-05-23 NOTE — PATIENT INSTRUCTIONS
IT WAS GREAT TO SEE YOU TODAY!    DRINK LOTS OF WATER!    PLEASE TAKE ALL MEDICATION AS DISCUSSED.    ~TAKE THE CEFDINIR TWICE A DAY WITH FOOD TO HELP WITH YOUR SINUS INFECTION.  TAKE A PROBIOTIC OR EAT YOGURT WHILE ON THIS MEDICINE.    ~USE YOUR NOSE SPRAY AND KEEP TAKING THE CLARITIN-D.    I WILL SEE YOU AGAIN IN 4 MONTHS BUT PLEASE CALL WITH CONCERNS [phone]

## 2024-05-23 NOTE — PROGRESS NOTES
this is a billable service, which includes applicable co-pays. This Virtual Visit was conducted with patient's (and/or legal guardian's) consent. Patient identification was verified, and a caregiver was present when appropriate.   The patient was located at Home: [address] [place] [address] [place] [address]  Provider was located at Facility (Appt Dept): [address] [place] [address]  Confirm you are appropriately licensed, registered, or certified to deliver care in the state where the patient is located as indicated above. If you are not or unsure, please re-schedule the visit: Yes, I confirm.         Sully Hollins DO  4:22 PM  05/23/24

## 2024-09-12 ENCOUNTER — OFFICE VISIT (OUTPATIENT)
Dept: OBGYN CLINIC | Age: 44
End: 2024-09-12
Payer: COMMERCIAL

## 2024-09-12 ENCOUNTER — OFFICE VISIT (OUTPATIENT)
Dept: PRIMARY CARE CLINIC | Facility: CLINIC | Age: 44
End: 2024-09-12
Payer: COMMERCIAL

## 2024-09-12 VITALS
TEMPERATURE: 97.7 F | HEART RATE: 81 BPM | DIASTOLIC BLOOD PRESSURE: 76 MMHG | WEIGHT: 124 LBS | SYSTOLIC BLOOD PRESSURE: 114 MMHG | BODY MASS INDEX: 21.97 KG/M2 | OXYGEN SATURATION: 99 % | HEIGHT: 63 IN

## 2024-09-12 VITALS
WEIGHT: 127 LBS | BODY MASS INDEX: 22.5 KG/M2 | DIASTOLIC BLOOD PRESSURE: 84 MMHG | SYSTOLIC BLOOD PRESSURE: 120 MMHG | HEIGHT: 63 IN

## 2024-09-12 DIAGNOSIS — R87.619 ATYPICAL GLANDULAR CELLS OF UNDETERMINED SIGNIFICANCE (AGUS) ON CERVICAL PAP SMEAR: Primary | ICD-10-CM

## 2024-09-12 DIAGNOSIS — Z00.00 ENCOUNTER FOR WELL ADULT EXAM WITHOUT ABNORMAL FINDINGS: Primary | ICD-10-CM

## 2024-09-12 DIAGNOSIS — Z11.51 SCREENING FOR HUMAN PAPILLOMAVIRUS (HPV): ICD-10-CM

## 2024-09-12 PROCEDURE — 99213 OFFICE O/P EST LOW 20 MIN: CPT | Performed by: NURSE PRACTITIONER

## 2024-09-12 PROCEDURE — 99396 PREV VISIT EST AGE 40-64: CPT | Performed by: FAMILY MEDICINE

## 2024-09-12 ASSESSMENT — PATIENT HEALTH QUESTIONNAIRE - PHQ9
2. FEELING DOWN, DEPRESSED OR HOPELESS: NOT AT ALL
SUM OF ALL RESPONSES TO PHQ QUESTIONS 1-9: 0
SUM OF ALL RESPONSES TO PHQ QUESTIONS 1-9: 0
1. LITTLE INTEREST OR PLEASURE IN DOING THINGS: NOT AT ALL
SUM OF ALL RESPONSES TO PHQ9 QUESTIONS 1 & 2: 0
SUM OF ALL RESPONSES TO PHQ QUESTIONS 1-9: 0
SUM OF ALL RESPONSES TO PHQ QUESTIONS 1-9: 0

## 2024-09-12 ASSESSMENT — ENCOUNTER SYMPTOMS
ABDOMINAL PAIN: 0
NAUSEA: 0
VOMITING: 0
SHORTNESS OF BREATH: 0
COUGH: 0
DIARRHEA: 0

## 2024-09-19 LAB
COLLECTION METHOD: NORMAL
CYTOLOGIST CVX/VAG CYTO: NORMAL
CYTOLOGY CVX/VAG DOC THIN PREP: NORMAL
HPV APTIMA: NEGATIVE
Lab: NORMAL
PAP SOURCE: NORMAL
PATH REPORT.FINAL DX SPEC: NORMAL
STAT OF ADQ CVX/VAG CYTO-IMP: NORMAL